# Patient Record
Sex: FEMALE | Race: BLACK OR AFRICAN AMERICAN | Employment: OTHER | URBAN - METROPOLITAN AREA
[De-identification: names, ages, dates, MRNs, and addresses within clinical notes are randomized per-mention and may not be internally consistent; named-entity substitution may affect disease eponyms.]

---

## 2018-02-13 ENCOUNTER — APPOINTMENT (OUTPATIENT)
Dept: CT IMAGING | Age: 53
DRG: 390 | End: 2018-02-13
Attending: PHYSICIAN ASSISTANT
Payer: OTHER GOVERNMENT

## 2018-02-13 ENCOUNTER — HOSPITAL ENCOUNTER (INPATIENT)
Age: 53
LOS: 1 days | Discharge: HOME | DRG: 390 | End: 2018-02-16
Attending: EMERGENCY MEDICINE | Admitting: SURGERY
Payer: OTHER GOVERNMENT

## 2018-02-13 DIAGNOSIS — K56.600 PARTIAL SMALL BOWEL OBSTRUCTION (HCC): Primary | ICD-10-CM

## 2018-02-13 LAB
ALBUMIN SERPL-MCNC: 4 G/DL (ref 3.5–5)
ALBUMIN/GLOB SERPL: 1.1 {RATIO} (ref 1.1–2.2)
ALP SERPL-CCNC: 94 U/L (ref 45–117)
ALT SERPL-CCNC: 12 U/L (ref 12–78)
ANION GAP SERPL CALC-SCNC: 11 MMOL/L (ref 5–15)
APPEARANCE UR: CLEAR
AST SERPL-CCNC: 20 U/L (ref 15–37)
BACTERIA URNS QL MICRO: NEGATIVE /HPF
BASOPHILS # BLD: 0 K/UL (ref 0–0.1)
BASOPHILS NFR BLD: 0 % (ref 0–1)
BILIRUB SERPL-MCNC: 0.9 MG/DL (ref 0.2–1)
BILIRUB UR QL: NEGATIVE
BUN SERPL-MCNC: 12 MG/DL (ref 6–20)
BUN/CREAT SERPL: 13 (ref 12–20)
CALCIUM SERPL-MCNC: 9.7 MG/DL (ref 8.5–10.1)
CHLORIDE SERPL-SCNC: 104 MMOL/L (ref 97–108)
CO2 SERPL-SCNC: 25 MMOL/L (ref 21–32)
COLOR UR: ABNORMAL
CREAT SERPL-MCNC: 0.94 MG/DL (ref 0.55–1.02)
DIFFERENTIAL METHOD BLD: ABNORMAL
EOSINOPHIL # BLD: 0 K/UL (ref 0–0.4)
EOSINOPHIL NFR BLD: 0 % (ref 0–7)
EPITH CASTS URNS QL MICRO: ABNORMAL /LPF
ERYTHROCYTE [DISTWIDTH] IN BLOOD BY AUTOMATED COUNT: 12.2 % (ref 11.5–14.5)
GLOBULIN SER CALC-MCNC: 3.7 G/DL (ref 2–4)
GLUCOSE SERPL-MCNC: 101 MG/DL (ref 65–100)
GLUCOSE UR STRIP.AUTO-MCNC: NEGATIVE MG/DL
HCT VFR BLD AUTO: 38 % (ref 35–47)
HGB BLD-MCNC: 12.9 G/DL (ref 11.5–16)
HGB UR QL STRIP: NEGATIVE
HYALINE CASTS URNS QL MICRO: ABNORMAL /LPF (ref 0–5)
IMM GRANULOCYTES # BLD: 0 K/UL (ref 0–0.04)
IMM GRANULOCYTES NFR BLD AUTO: 0 % (ref 0–0.5)
KETONES UR QL STRIP.AUTO: 15 MG/DL
LEUKOCYTE ESTERASE UR QL STRIP.AUTO: NEGATIVE
LIPASE SERPL-CCNC: 114 U/L (ref 73–393)
LYMPHOCYTES # BLD: 1.6 K/UL (ref 0.8–3.5)
LYMPHOCYTES NFR BLD: 36 % (ref 12–49)
MCH RBC QN AUTO: 31 PG (ref 26–34)
MCHC RBC AUTO-ENTMCNC: 33.9 G/DL (ref 30–36.5)
MCV RBC AUTO: 91.3 FL (ref 80–99)
MONOCYTES # BLD: 0.2 K/UL (ref 0–1)
MONOCYTES NFR BLD: 4 % (ref 5–13)
NEUTS SEG # BLD: 2.7 K/UL (ref 1.8–8)
NEUTS SEG NFR BLD: 59 % (ref 32–75)
NITRITE UR QL STRIP.AUTO: NEGATIVE
NRBC # BLD: 0 K/UL (ref 0–0.01)
NRBC BLD-RTO: 0 PER 100 WBC
PH UR STRIP: 8 [PH] (ref 5–8)
PLATELET # BLD AUTO: 287 K/UL (ref 150–400)
PMV BLD AUTO: 10.2 FL (ref 8.9–12.9)
POTASSIUM SERPL-SCNC: 3.6 MMOL/L (ref 3.5–5.1)
PROT SERPL-MCNC: 7.7 G/DL (ref 6.4–8.2)
PROT UR STRIP-MCNC: NEGATIVE MG/DL
RBC # BLD AUTO: 4.16 M/UL (ref 3.8–5.2)
RBC #/AREA URNS HPF: ABNORMAL /HPF (ref 0–5)
SODIUM SERPL-SCNC: 140 MMOL/L (ref 136–145)
SP GR UR REFRACTOMETRY: 1.02 (ref 1–1.03)
UR CULT HOLD, URHOLD: NORMAL
UROBILINOGEN UR QL STRIP.AUTO: 0.2 EU/DL (ref 0.2–1)
WBC # BLD AUTO: 4.5 K/UL (ref 3.6–11)
WBC URNS QL MICRO: ABNORMAL /HPF (ref 0–4)

## 2018-02-13 PROCEDURE — 74011250636 HC RX REV CODE- 250/636: Performed by: EMERGENCY MEDICINE

## 2018-02-13 PROCEDURE — 74011250636 HC RX REV CODE- 250/636: Performed by: PHYSICIAN ASSISTANT

## 2018-02-13 PROCEDURE — 96374 THER/PROPH/DIAG INJ IV PUSH: CPT

## 2018-02-13 PROCEDURE — 96375 TX/PRO/DX INJ NEW DRUG ADDON: CPT

## 2018-02-13 PROCEDURE — 96376 TX/PRO/DX INJ SAME DRUG ADON: CPT

## 2018-02-13 PROCEDURE — 74011250636 HC RX REV CODE- 250/636: Performed by: SURGERY

## 2018-02-13 PROCEDURE — 74011636320 HC RX REV CODE- 636/320: Performed by: RADIOLOGY

## 2018-02-13 PROCEDURE — 80053 COMPREHEN METABOLIC PANEL: CPT | Performed by: PHYSICIAN ASSISTANT

## 2018-02-13 PROCEDURE — 81001 URINALYSIS AUTO W/SCOPE: CPT | Performed by: PHYSICIAN ASSISTANT

## 2018-02-13 PROCEDURE — 36415 COLL VENOUS BLD VENIPUNCTURE: CPT | Performed by: PHYSICIAN ASSISTANT

## 2018-02-13 PROCEDURE — 74177 CT ABD & PELVIS W/CONTRAST: CPT

## 2018-02-13 PROCEDURE — 99284 EMERGENCY DEPT VISIT MOD MDM: CPT

## 2018-02-13 PROCEDURE — 85025 COMPLETE CBC W/AUTO DIFF WBC: CPT | Performed by: PHYSICIAN ASSISTANT

## 2018-02-13 PROCEDURE — 96361 HYDRATE IV INFUSION ADD-ON: CPT

## 2018-02-13 PROCEDURE — 99218 HC RM OBSERVATION: CPT

## 2018-02-13 PROCEDURE — 83690 ASSAY OF LIPASE: CPT | Performed by: PHYSICIAN ASSISTANT

## 2018-02-13 RX ORDER — NALOXONE HYDROCHLORIDE 0.4 MG/ML
0.4 INJECTION, SOLUTION INTRAMUSCULAR; INTRAVENOUS; SUBCUTANEOUS AS NEEDED
Status: DISCONTINUED | OUTPATIENT
Start: 2018-02-13 | End: 2018-02-16 | Stop reason: HOSPADM

## 2018-02-13 RX ORDER — FERROUS SULFATE, DRIED 160(50) MG
1 TABLET, EXTENDED RELEASE ORAL DAILY
COMMUNITY

## 2018-02-13 RX ORDER — MORPHINE SULFATE 2 MG/ML
4 INJECTION, SOLUTION INTRAMUSCULAR; INTRAVENOUS
Status: COMPLETED | OUTPATIENT
Start: 2018-02-13 | End: 2018-02-13

## 2018-02-13 RX ORDER — ONDANSETRON 2 MG/ML
4 INJECTION INTRAMUSCULAR; INTRAVENOUS
Status: COMPLETED | OUTPATIENT
Start: 2018-02-13 | End: 2018-02-13

## 2018-02-13 RX ORDER — HYDROCORTISONE 5 MG/1
5 TABLET ORAL
COMMUNITY

## 2018-02-13 RX ORDER — LEVOTHYROXINE SODIUM 150 UG/1
150 TABLET ORAL
COMMUNITY

## 2018-02-13 RX ORDER — SODIUM CHLORIDE 0.9 % (FLUSH) 0.9 %
5-10 SYRINGE (ML) INJECTION EVERY 8 HOURS
Status: DISCONTINUED | OUTPATIENT
Start: 2018-02-13 | End: 2018-02-16 | Stop reason: HOSPADM

## 2018-02-13 RX ORDER — DOCUSATE SODIUM 100 MG/1
100 CAPSULE, LIQUID FILLED ORAL
COMMUNITY

## 2018-02-13 RX ORDER — MELATONIN
2000 DAILY
COMMUNITY

## 2018-02-13 RX ORDER — MORPHINE SULFATE 4 MG/ML
4 INJECTION INTRAVENOUS
Status: COMPLETED | OUTPATIENT
Start: 2018-02-13 | End: 2018-02-13

## 2018-02-13 RX ORDER — HYDROCORTISONE 5 MG/1
15 TABLET ORAL
COMMUNITY

## 2018-02-13 RX ORDER — DEXTROSE, SODIUM CHLORIDE, AND POTASSIUM CHLORIDE 5; .45; .15 G/100ML; G/100ML; G/100ML
125 INJECTION INTRAVENOUS CONTINUOUS
Status: DISCONTINUED | OUTPATIENT
Start: 2018-02-13 | End: 2018-02-16

## 2018-02-13 RX ORDER — MORPHINE SULFATE 2 MG/ML
2 INJECTION, SOLUTION INTRAMUSCULAR; INTRAVENOUS
Status: DISCONTINUED | OUTPATIENT
Start: 2018-02-13 | End: 2018-02-16 | Stop reason: HOSPADM

## 2018-02-13 RX ORDER — ALENDRONATE SODIUM 70 MG/1
70 TABLET ORAL
COMMUNITY

## 2018-02-13 RX ORDER — ONDANSETRON 2 MG/ML
4 INJECTION INTRAMUSCULAR; INTRAVENOUS
Status: DISCONTINUED | OUTPATIENT
Start: 2018-02-13 | End: 2018-02-16 | Stop reason: HOSPADM

## 2018-02-13 RX ORDER — CLINDAMYCIN HYDROCHLORIDE 300 MG/1
300 CAPSULE ORAL 4 TIMES DAILY
Qty: 28 CAP | Refills: 0 | Status: SHIPPED | OUTPATIENT
Start: 2018-02-13 | End: 2018-02-13

## 2018-02-13 RX ORDER — SODIUM CHLORIDE 0.9 % (FLUSH) 0.9 %
5-10 SYRINGE (ML) INJECTION AS NEEDED
Status: DISCONTINUED | OUTPATIENT
Start: 2018-02-13 | End: 2018-02-16 | Stop reason: HOSPADM

## 2018-02-13 RX ORDER — HYDROCHLOROTHIAZIDE 12.5 MG/1
12.5 TABLET ORAL DAILY
COMMUNITY

## 2018-02-13 RX ADMIN — ONDANSETRON 4 MG: 2 INJECTION INTRAMUSCULAR; INTRAVENOUS at 18:19

## 2018-02-13 RX ADMIN — IOPAMIDOL 100 ML: 755 INJECTION, SOLUTION INTRAVENOUS at 19:22

## 2018-02-13 RX ADMIN — MORPHINE SULFATE 4 MG: 2 INJECTION, SOLUTION INTRAMUSCULAR; INTRAVENOUS at 21:00

## 2018-02-13 RX ADMIN — MORPHINE SULFATE 4 MG: 4 INJECTION INTRAVENOUS at 18:20

## 2018-02-13 RX ADMIN — ONDANSETRON 4 MG: 2 INJECTION INTRAMUSCULAR; INTRAVENOUS at 22:55

## 2018-02-13 RX ADMIN — DEXTROSE MONOHYDRATE, SODIUM CHLORIDE, AND POTASSIUM CHLORIDE 150 ML/HR: 50; 4.5; 1.49 INJECTION, SOLUTION INTRAVENOUS at 22:58

## 2018-02-13 RX ADMIN — SODIUM CHLORIDE 1000 ML: 900 INJECTION, SOLUTION INTRAVENOUS at 18:19

## 2018-02-13 RX ADMIN — MORPHINE SULFATE 2 MG: 2 INJECTION, SOLUTION INTRAMUSCULAR; INTRAVENOUS at 22:56

## 2018-02-13 RX ADMIN — Medication 10 ML: at 22:00

## 2018-02-13 NOTE — ED PROVIDER NOTES
HPI Comments: Kayla Sierra is a 52 y.o. female  who presents by private vehicle to ER with c/o Patient presents with:  Abdominal Pain. Patient with abdominal pain that started around 3pm today after drinking cranberry juice. PAtient reports nausea. Denies vomiting or diarrhea. Patient reports cramping pain. She specifically denies any fevers, chills, vomiting, chest pain, shortness of breath, headache, rash, diarrhea, , urinary/bowel changes, sweating or weight loss. PCP: Shayne Leos MD   PMHx significant for: No past medical history on file. PSHx significant for: No past surgical history on file. Social Hx: Tobacco use: Smoking status: Not on file                        Smokeless status: Not on file                     ; EtOH use: The patient states she drinks 0 per week.; Illicit Drug use: Allergies:   -- Ultram (Tramadol) -- Other (comments)    --  Impending doom    There are no other complaints, changes or physical findings at this time. Patient is a 52 y.o. female presenting with abdominal pain. The history is provided by the patient. Abdominal Pain    This is a new problem. The current episode started 1 to 2 hours ago. The problem occurs constantly. The problem has not changed since onset. The pain is located in the epigastric region. The quality of the pain is sharp. The pain is at a severity of 10/10. The pain is severe. Associated symptoms include nausea. Pertinent negatives include no anorexia, no belching, no flatus, no vomiting, no constipation, no dysuria, no trauma, no chest pain and no back pain. Nothing worsens the pain. The pain is relieved by nothing. Her past medical history does not include ulcerative colitis, UTI or diverticulitis. The patient's surgical history non-contributory. No past medical history on file. No past surgical history on file. No family history on file.     Social History     Social History    Marital status:      Spouse name: N/A  Number of children: N/A    Years of education: N/A     Occupational History    Not on file. Social History Main Topics    Smoking status: Not on file    Smokeless tobacco: Not on file    Alcohol use Not on file    Drug use: Not on file    Sexual activity: Not on file     Other Topics Concern    Not on file     Social History Narrative         ALLERGIES: Ultram [tramadol]    Review of Systems   Cardiovascular: Negative for chest pain. Gastrointestinal: Positive for abdominal pain and nausea. Negative for anorexia, constipation, flatus and vomiting. Genitourinary: Negative for dysuria. Musculoskeletal: Negative for back pain. Vitals:    02/13/18 1753   BP: 118/58   Pulse: 83   Resp: 18   Temp: 97.7 °F (36.5 °C)   SpO2: 100%   Weight: 90.7 kg (200 lb)   Height: 5' 4\" (1.626 m)            Physical Exam   Constitutional: She is oriented to person, place, and time. She appears well-developed. She appears ill. HENT:   Head: Normocephalic and atraumatic. Right Ear: External ear normal.   Left Ear: External ear normal.   Nose: Nose normal.   Mouth/Throat: Oropharynx is clear and moist. No oropharyngeal exudate. Eyes: Conjunctivae, EOM and lids are normal. Right eye exhibits no discharge. Left eye exhibits no discharge. Neck: Normal range of motion. No tracheal deviation present. No thyromegaly present. Cardiovascular: Normal rate, regular rhythm, normal heart sounds and intact distal pulses. Pulmonary/Chest: Effort normal and breath sounds normal.   Abdominal: Soft. Normal appearance and bowel sounds are normal. There is tenderness in the epigastric area. Musculoskeletal: Normal range of motion. Neurological: She is alert and oriented to person, place, and time. Skin: Skin is warm and dry. Psychiatric: She has a normal mood and affect.  Judgment normal.        MDM  Number of Diagnoses or Management Options  Partial small bowel obstruction:   Diagnosis management comments: 10:49 PM  Patient is being admitted to the hospital.  The results of their tests and reasons for their admission have been discussed with them and/or available family. They convey agreement and understanding for the need to be admitted and for their admission diagnosis. Consultation has been made with the inpatient physician specialist for hospitalization.     LABORATORY TESTS:  Recent Results (from the past 12 hour(s))  -CBC WITH AUTOMATED DIFF  Collection Time: 02/13/18  6:09 PM       Result                                            Value                         Ref Range                       WBC                                               4.5                           3.6 - 11.0 K/uL                 RBC                                               4.16                          3.80 - 5.20 M/uL                HGB                                               12.9                          11.5 - 16.0 g/dL                HCT                                               38.0                          35.0 - 47.0 %                   MCV                                               91.3                          80.0 - 99.0 FL                  MCH                                               31.0                          26.0 - 34.0 PG                  MCHC                                              33.9                          30.0 - 36.5 g/dL                RDW                                               12.2                          11.5 - 14.5 %                   PLATELET                                          287                           150 - 400 K/uL                  MPV                                               10.2                          8.9 - 12.9 FL                   NRBC                                              0.0                           0  WBC                   ABSOLUTE NRBC                                     0.00                          0.00 - 0.01 K/uL NEUTROPHILS                                       59                            32 - 75 %                       LYMPHOCYTES                                       36                            12 - 49 %                       MONOCYTES                                         4 (L)                         5 - 13 %                        EOSINOPHILS                                       0                             0 - 7 %                         BASOPHILS                                         0                             0 - 1 %                         IMMATURE GRANULOCYTES                             0                             0.0 - 0.5 %                     ABS. NEUTROPHILS                                  2.7                           1.8 - 8.0 K/UL                  ABS. LYMPHOCYTES                                  1.6                           0.8 - 3.5 K/UL                  ABS. MONOCYTES                                    0.2                           0.0 - 1.0 K/UL                  ABS. EOSINOPHILS                                  0.0                           0.0 - 0.4 K/UL                  ABS. BASOPHILS                                    0.0                           0.0 - 0.1 K/UL                  ABS. IMM.  GRANS.                                  0.0                           0.00 - 0.04 K/UL                DF                                                AUTOMATED                                                -METABOLIC PANEL, COMPREHENSIVE  Collection Time: 02/13/18  6:09 PM       Result                                            Value                         Ref Range                       Sodium                                            140                           136 - 145 mmol/L                Potassium                                         3.6                           3.5 - 5.1 mmol/L                Chloride                                          104                           97 - 108 mmol/L                 CO2                                               25                            21 - 32 mmol/L                  Anion gap                                         11                            5 - 15 mmol/L                   Glucose                                           101 (H)                       65 - 100 mg/dL                  BUN                                               12                            6 - 20 MG/DL                    Creatinine                                        0.94                          0.55 - 1.02 MG/DL               BUN/Creatinine ratio                              13                            12 - 20                         GFR est AA                                        >60                           >60 ml/min/1.73m2               GFR est non-AA                                    >60                           >60 ml/min/1.73m2               Calcium                                           9.7                           8.5 - 10.1 MG/DL                Bilirubin, total                                  0.9                           0.2 - 1.0 MG/DL                 ALT (SGPT)                                        12                            12 - 78 U/L                     AST (SGOT)                                        20                            15 - 37 U/L                     Alk. phosphatase                                  94                            45 - 117 U/L                    Protein, total                                    7.7                           6.4 - 8.2 g/dL                  Albumin                                           4.0                           3.5 - 5.0 g/dL                  Globulin                                          3.7                           2.0 - 4.0 g/dL                  A-G Ratio                                         1.1                           1.1 - 2.2                  -LIPASE  Collection Time: 02/13/18  6:09 PM       Result                                            Value                         Ref Range                       Lipase                                            114                           73 - 393 U/L               -URINALYSIS W/MICROSCOPIC  Collection Time: 02/13/18  7:42 PM       Result                                            Value                         Ref Range                       Color                                             YELLOW/STRAW                                                  Appearance                                        CLEAR                         CLEAR                           Specific gravity                                  1.017                         1.003 - 1.030                   pH (UA)                                           8.0                           5.0 - 8.0                       Protein                                           NEGATIVE                      NEG mg/dL                       Glucose                                           NEGATIVE                      NEG mg/dL                       Ketone                                            15 (A)                        NEG mg/dL                       Bilirubin                                         NEGATIVE                      NEG                             Blood                                             NEGATIVE                      NEG                             Urobilinogen                                      0.2                           0.2 - 1.0 EU/dL                 Nitrites                                          NEGATIVE                      NEG                             Leukocyte Esterase                                NEGATIVE                      NEG                             WBC                                               0-4                           0 - 4 /hpf                      RBC                                               0-5 0 - 5 /hpf                      Epithelial cells                                  FEW                           FEW /lpf                        Bacteria                                          NEGATIVE                      NEG /hpf                        Hyaline cast                                      0-2                           0 - 5 /lpf                 -URINE CULTURE HOLD SAMPLE  Collection Time: 02/13/18  7:42 PM       Result                                            Value                         Ref Range                       Urine culture hold                                                                                          URINE ON HOLD IN MICROBIOLOGY DEPT FOR 3 DAYS. IF UNPRESERVED URINE IS SUBMITTED, IT CANNOT BE USED FOR ADDITIONAL TESTING AFTER 24 HRS, RECOLLECTION WILL BE REQUIRED. IMAGING RESULTS:  See chart    MEDICATIONS GIVEN:  Medications  sodium chloride (NS) flush 5-10 mL (not administered)  sodium chloride (NS) flush 5-10 mL (not administered)  dextrose 5% - 0.45% NaCl with KCl 20 mEq/L infusion (not administered)  morphine injection 2 mg (not administered)  naloxone (NARCAN) injection 0.4 mg (not administered)  ondansetron (ZOFRAN) injection 4 mg (not administered)  sodium chloride 0.9 % bolus infusion 1,000 mL (1,000 mL IntraVENous New Bag 2/13/18 1819)  ondansetron (ZOFRAN) injection 4 mg (4 mg IntraVENous Given 2/13/18 1819)  morphine 4 mg (4 mg IntraVENous Given 2/13/18 1820)  iopamidol (ISOVUE-370) 76 % injection 100 mL (100 mL IntraVENous Given 2/13/18 1922)  morphine injection 4 mg (4 mg IntraVENous Given 2/13/18 2100)    IMPRESSION:  Partial small bowel obstruction  (primary encounter diagnosis)    PLAN:  1.  Admit to hospital         Amount and/or Complexity of Data Reviewed  Clinical lab tests: ordered and reviewed  Tests in the radiology section of CPT®: ordered and reviewed  Tests in the medicine section of CPT®: ordered and reviewed          ED Course Procedures             CONSULT NOTE:   9:17 PM  Diaz Paz PA-C spoke with Dr. Bhargavi Mccracken,   Specialty: Surgery  Discussed pt's hx, disposition, and available diagnostic and imaging results. Reviewed care plans. Consultant agrees with plans as outlined. Will see for admission.

## 2018-02-13 NOTE — IP AVS SNAPSHOT
[address] [place] [address] 
[phone] Patient: Valentine Bruce MRN: [id number] CGZ:0/97/[id number] A check clarissa indicates which time of day the medication should be taken. My Medications CONTINUE taking these medications Instructions Each Dose to Equal  
 Morning Noon Evening Bedtime  
 calcium-vitamin D 500 mg(1,250mg) -200 unit per tablet Commonly known as:  OYSTER SHELL Your last dose was: Your next dose is: Take 1 Tab by mouth daily. 1 Tab  
    
   
   
   
  
 cholecalciferol 1,000 unit tablet Commonly known as:  VITAMIN D3 Your last dose was: Your next dose is: Take 2,000 Units by mouth daily. 2000 Units  
    
   
   
   
  
 docusate sodium 100 mg capsule Commonly known as:  Alfonse Kras Your last dose was: Your next dose is: Take 100 mg by mouth two (2) times daily as needed for Constipation. 100 mg  
    
   
   
   
  
 FOSAMAX 70 mg tablet Generic drug:  alendronate Your last dose was: Your next dose is: Take 70 mg by mouth every Monday. 70 mg  
    
   
   
   
  
 hydroCHLOROthiazide 12.5 mg tablet Commonly known as:  HYDRODIURIL Your last dose was: Your next dose is: Take 12.5 mg by mouth daily. 12.5 mg  
    
   
   
   
  
 * hydrocortisone 5 mg tablet Commonly known as:  CORTEF Your last dose was: Your next dose is: Take 15 mg by mouth Daily (before breakfast). 15 mg  
    
   
   
   
  
 * hydrocortisone 5 mg tablet Commonly known as:  CORTEF Your last dose was: Your next dose is: Take 5 mg by mouth daily (after lunch). 5 mg SYNTHROID 150 mcg tablet Generic drug:  levothyroxine Your last dose was: Your next dose is: Take 150 mcg by mouth Daily (before breakfast). 150 mcg * Notice: This list has 2 medication(s) that are the same as other medications prescribed for you. Read the directions carefully, and ask your doctor or other care provider to review them with you.

## 2018-02-13 NOTE — IP AVS SNAPSHOT
[address] 
[phone] Patient: Eleonora Holt MRN: [id number] NNV:2/43/1421 About your hospitalization You were admitted on:  February 13, 2018 You last received care in the:  [facility] 4M POST SURG ORT 1 You were discharged on:  February 16, 2018 Why you were hospitalized Your primary diagnosis was:  Not on File Your diagnoses also included:  Partial Small Bowel Obstruction, Sbo (Small Bowel Obstruction) Follow-up Information Follow up With Details Comments Contact Info Rachael Gomez MD  As needed [address] [facility] [address] 
[phone] Phys Cristobal, MD   Patient can only remember the practice name and not the physician Discharge Orders None A check clarissa indicates which time of day the medication should be taken. My Medications CONTINUE taking these medications Instructions Each Dose to Equal  
 Morning Noon Evening Bedtime  
 calcium-vitamin D 500 mg(1,250mg) -200 unit per tablet Commonly known as:  OYSTER SHELL Your last dose was: Your next dose is: Take 1 Tab by mouth daily. 1 Tab  
    
   
   
   
  
 cholecalciferol 1,000 unit tablet Commonly known as:  VITAMIN D3 Your last dose was: Your next dose is: Take 2,000 Units by mouth daily. 2000 Units  
    
   
   
   
  
 docusate sodium 100 mg capsule Commonly known as:  Carolin Buckner Your last dose was: Your next dose is: Take 100 mg by mouth two (2) times daily as needed for Constipation. 100 mg  
    
   
   
   
  
 FOSAMAX 70 mg tablet Generic drug:  alendronate Your last dose was: Your next dose is: Take 70 mg by mouth every Monday. 70 mg  
    
   
   
   
  
 hydroCHLOROthiazide 12.5 mg tablet Commonly known as:  HYDRODIURIL  
   
 Your last dose was: Your next dose is: Take 12.5 mg by mouth daily. 12.5 mg  
    
   
   
   
  
 * hydrocortisone 5 mg tablet Commonly known as:  CORTEF Your last dose was: Your next dose is: Take 15 mg by mouth Daily (before breakfast). 15 mg  
    
   
   
   
  
 * hydrocortisone 5 mg tablet Commonly known as:  CORTEF Your last dose was: Your next dose is: Take 5 mg by mouth daily (after lunch). 5 mg SYNTHROID 150 mcg tablet Generic drug:  levothyroxine Your last dose was: Your next dose is: Take 150 mcg by mouth Daily (before breakfast). 150 mcg * Notice: This list has 2 medication(s) that are the same as other medications prescribed for you. Read the directions carefully, and ask your doctor or other care provider to review them with you. Discharge Instructions Patient Discharge Instructions Amanda Ranjans / [id number] : 1965 Admitted 2018 Discharged: 2018 Take Home Medications · It is important that you take the medication exactly as they are prescribed. · Keep your medication in the bottles provided by the pharmacist and keep a list of the medication names, dosages, and times to be taken in your wallet. · Do not take other medications without consulting your doctor. · Do not drive, drink alcohol, or operate machinery when taking sedating pain medication. · Take colace daily while on pain medication to help prevent constipation. You may take over the counter laxatives such as Dulcolax or Miralax as needed for constipation What to do at [facility] Recommended diet: Regular Diet Recommended activity: As tolerated Follow up with Dr. Master Steinberg as needed. Call Dr. Master Steinberg or go to the ER if you develop worsening pain, fever, vomiting, or any other symptoms that concern you. Introducing [facility]! Darin Slade introduces [employer] patient portal. Now you can access parts of your medical record, email your doctor's office, and request medication refills online. 1. In your internet browser, go to [email or web address] 2. Click on the First Time User? Click Here link in the Sign In box. You will see the New Member Sign Up page. 3. Enter your [employer] Access Code exactly as it appears below. You will not need to use this code after youve completed the sign-up process. If you do not sign up before the expiration date, you must request a new code. · [employer] Access Code: [email or web address] Expires: 5/14/2018  6:45 PM 
 
4. Enter the last four digits of your Social Security Number (xxxx) and Date of Birth (mm/dd/yyyy) as indicated and click Submit. You will be taken to the next sign-up page. 5. Create a [employer] ID. This will be your [employer] login ID and cannot be changed, so think of one that is secure and easy to remember. 6. Create a [employer] password. You can change your password at any time. 7. Enter your Password Reset Question and Answer. This can be used at a later time if you forget your password. 8. Enter your e-mail address. You will receive e-mail notification when new information is available in [address] 9. Click Sign Up. You can now view and download portions of your medical record. 10. Click the Download Summary menu link to download a portable copy of your medical information. If you have questions, please visit the Frequently Asked Questions section of the [employer] website. Remember, [employer] is NOT to be used for urgent needs. For medical emergencies, dial 911. Now available from your iPhone and Android! Providers Seen During Your Hospitalization Provider Specialty Primary office phone Joann Goldsmith. Yolande Wilde, [address] [facility] Emergency Medicine [phone] Solange Mistry MD General Surgery [phone] Your Primary Care Physician (PCP) Primary Care Physician Office Phone Office Fax OTHER, PHYS ** None ** ** None ** You are allergic to the following Allergen Reactions Ultram (Tramadol) Other (comments) Impending doom Recent Documentation Height Weight BMI OB Status Smoking Status 1.626 m 90.7 kg 34.33 kg/m2 Postmenopausal Never Smoker Emergency Contacts Name Discharge Info Relation Home Work Mobile Thao Braros DISCHARGE CAREGIVER [3] Unknown [9]   [phone] Patient Belongings The following personal items are in your possession at time of discharge: 
  Dental Appliances: None  Visual Aid: None      Home Medications: None   Jewelry: None  Clothing: At bedside    Other Valuables: None Please provide this summary of care documentation to your next provider. Signatures-by signing, you are acknowledging that this After Visit Summary has been reviewed with you and you have received a copy. Patient Signature:  ____________________________________________________________ Date:  ____________________________________________________________  
  
Shaquille Lazo Provider Signature:  ____________________________________________________________ Date:  ____________________________________________________________

## 2018-02-14 ENCOUNTER — APPOINTMENT (OUTPATIENT)
Dept: GENERAL RADIOLOGY | Age: 53
DRG: 390 | End: 2018-02-14
Attending: SURGERY
Payer: OTHER GOVERNMENT

## 2018-02-14 LAB
ANION GAP SERPL CALC-SCNC: 4 MMOL/L (ref 5–15)
BASOPHILS # BLD: 0 K/UL (ref 0–0.1)
BASOPHILS NFR BLD: 0 % (ref 0–1)
BUN SERPL-MCNC: 10 MG/DL (ref 6–20)
BUN/CREAT SERPL: 12 (ref 12–20)
CALCIUM SERPL-MCNC: 8.3 MG/DL (ref 8.5–10.1)
CHLORIDE SERPL-SCNC: 109 MMOL/L (ref 97–108)
CO2 SERPL-SCNC: 29 MMOL/L (ref 21–32)
CREAT SERPL-MCNC: 0.83 MG/DL (ref 0.55–1.02)
DIFFERENTIAL METHOD BLD: ABNORMAL
EOSINOPHIL # BLD: 0 K/UL (ref 0–0.4)
EOSINOPHIL NFR BLD: 1 % (ref 0–7)
ERYTHROCYTE [DISTWIDTH] IN BLOOD BY AUTOMATED COUNT: 12.3 % (ref 11.5–14.5)
GLUCOSE SERPL-MCNC: 107 MG/DL (ref 65–100)
HCT VFR BLD AUTO: 33 % (ref 35–47)
HGB BLD-MCNC: 11.1 G/DL (ref 11.5–16)
IMM GRANULOCYTES # BLD: 0 K/UL (ref 0–0.04)
IMM GRANULOCYTES NFR BLD AUTO: 0 % (ref 0–0.5)
LYMPHOCYTES # BLD: 1.8 K/UL (ref 0.8–3.5)
LYMPHOCYTES NFR BLD: 44 % (ref 12–49)
MCH RBC QN AUTO: 31.1 PG (ref 26–34)
MCHC RBC AUTO-ENTMCNC: 33.6 G/DL (ref 30–36.5)
MCV RBC AUTO: 92.4 FL (ref 80–99)
MONOCYTES # BLD: 0.4 K/UL (ref 0–1)
MONOCYTES NFR BLD: 10 % (ref 5–13)
NEUTS SEG # BLD: 1.8 K/UL (ref 1.8–8)
NEUTS SEG NFR BLD: 45 % (ref 32–75)
NRBC # BLD: 0 K/UL (ref 0–0.01)
NRBC BLD-RTO: 0 PER 100 WBC
PLATELET # BLD AUTO: 254 K/UL (ref 150–400)
PMV BLD AUTO: 9.7 FL (ref 8.9–12.9)
POTASSIUM SERPL-SCNC: 3.8 MMOL/L (ref 3.5–5.1)
RBC # BLD AUTO: 3.57 M/UL (ref 3.8–5.2)
SODIUM SERPL-SCNC: 142 MMOL/L (ref 136–145)
WBC # BLD AUTO: 4 K/UL (ref 3.6–11)

## 2018-02-14 PROCEDURE — 80048 BASIC METABOLIC PNL TOTAL CA: CPT | Performed by: SURGERY

## 2018-02-14 PROCEDURE — 85025 COMPLETE CBC W/AUTO DIFF WBC: CPT | Performed by: SURGERY

## 2018-02-14 PROCEDURE — 74011250636 HC RX REV CODE- 250/636: Performed by: PHYSICIAN ASSISTANT

## 2018-02-14 PROCEDURE — 74011250636 HC RX REV CODE- 250/636: Performed by: SURGERY

## 2018-02-14 PROCEDURE — 71045 X-RAY EXAM CHEST 1 VIEW: CPT

## 2018-02-14 PROCEDURE — 74019 RADEX ABDOMEN 2 VIEWS: CPT

## 2018-02-14 PROCEDURE — 36415 COLL VENOUS BLD VENIPUNCTURE: CPT | Performed by: SURGERY

## 2018-02-14 PROCEDURE — 99218 HC RM OBSERVATION: CPT

## 2018-02-14 PROCEDURE — 77030032490 HC SLV COMPR SCD KNE COVD -B

## 2018-02-14 PROCEDURE — 96376 TX/PRO/DX INJ SAME DRUG ADON: CPT

## 2018-02-14 PROCEDURE — 96361 HYDRATE IV INFUSION ADD-ON: CPT

## 2018-02-14 PROCEDURE — 74011250637 HC RX REV CODE- 250/637: Performed by: PHYSICIAN ASSISTANT

## 2018-02-14 RX ORDER — FACIAL-BODY WIPES
10 EACH TOPICAL ONCE
Status: COMPLETED | OUTPATIENT
Start: 2018-02-14 | End: 2018-02-14

## 2018-02-14 RX ORDER — FACIAL-BODY WIPES
10 EACH TOPICAL DAILY PRN
Status: DISCONTINUED | OUTPATIENT
Start: 2018-02-14 | End: 2018-02-16 | Stop reason: HOSPADM

## 2018-02-14 RX ORDER — PROCHLORPERAZINE EDISYLATE 5 MG/ML
10 INJECTION INTRAMUSCULAR; INTRAVENOUS
Status: DISCONTINUED | OUTPATIENT
Start: 2018-02-14 | End: 2018-02-16 | Stop reason: HOSPADM

## 2018-02-14 RX ORDER — ENOXAPARIN SODIUM 100 MG/ML
40 INJECTION SUBCUTANEOUS EVERY 24 HOURS
Status: DISCONTINUED | OUTPATIENT
Start: 2018-02-15 | End: 2018-02-16 | Stop reason: HOSPADM

## 2018-02-14 RX ADMIN — MORPHINE SULFATE 2 MG: 2 INJECTION, SOLUTION INTRAMUSCULAR; INTRAVENOUS at 05:55

## 2018-02-14 RX ADMIN — ONDANSETRON 4 MG: 2 INJECTION INTRAMUSCULAR; INTRAVENOUS at 05:58

## 2018-02-14 RX ADMIN — MORPHINE SULFATE 2 MG: 2 INJECTION, SOLUTION INTRAMUSCULAR; INTRAVENOUS at 09:39

## 2018-02-14 RX ADMIN — DEXTROSE MONOHYDRATE, SODIUM CHLORIDE, AND POTASSIUM CHLORIDE 150 ML/HR: 50; 4.5; 1.49 INJECTION, SOLUTION INTRAVENOUS at 05:55

## 2018-02-14 RX ADMIN — ONDANSETRON 4 MG: 2 INJECTION INTRAMUSCULAR; INTRAVENOUS at 09:40

## 2018-02-14 RX ADMIN — Medication 10 ML: at 06:00

## 2018-02-14 RX ADMIN — PROCHLORPERAZINE EDISYLATE 10 MG: 5 INJECTION INTRAMUSCULAR; INTRAVENOUS at 17:39

## 2018-02-14 RX ADMIN — MORPHINE SULFATE 2 MG: 2 INJECTION, SOLUTION INTRAMUSCULAR; INTRAVENOUS at 17:36

## 2018-02-14 RX ADMIN — BISACODYL 10 MG: 10 SUPPOSITORY RECTAL at 19:59

## 2018-02-14 NOTE — PROGRESS NOTES
Patient complained of nausea regardless of pain medication. Correne Heimlich, PA aware of nausea,  NG tube inserted, 14 [place] without difficulty per order.

## 2018-02-14 NOTE — PROGRESS NOTES
125 cc clear liquid in NG tube collection container. NG remains connected to LIWS per order. Patient states she feels better since NG placed with less abdominal pain.

## 2018-02-14 NOTE — PROGRESS NOTES
[facility]: MED RECONCILIATION    Comments/Recommendations:    Patient was able to provide the name and frequency of all her current medications. [place] clarified name, dose and frequencies of current medications.  Patient's Levothyroxine dose was increased from 137 mcg to 150 mcg daily. Information obtained from: [facility]/ Patient    Allergies: Ultram [tramadol]    Prior to Admission Medications:     Prior to Admission Medications   Prescriptions Last Dose Informant Patient Reported? Taking? alendronate (FOSAMAX) 70 mg tablet 2/12/2018 at am  Yes Yes   Sig: Take 70 mg by mouth every Monday. calcium-vitamin D (OYSTER SHELL) 500 mg(1,250mg) -200 unit per tablet 2/12/2018 at Unknown time  Yes Yes   Sig: Take 1 Tab by mouth daily. cholecalciferol (VITAMIN D3) 1,000 unit tablet 2/12/2018 at Unknown time  Yes Yes   Sig: Take 2,000 Units by mouth daily. docusate sodium (COLACE) 100 mg capsule   Yes Yes   Sig: Take 100 mg by mouth two (2) times daily as needed for Constipation. hydroCHLOROthiazide (HYDRODIURIL) 12.5 mg tablet 2/12/2018 at Unknown time  Yes Yes   Sig: Take 12.5 mg by mouth daily. hydrocortisone (CORTEF) 5 mg tablet 2/12/2018 at am  Yes Yes   Sig: Take 15 mg by mouth Daily (before breakfast). hydrocortisone (CORTEF) 5 mg tablet 2/12/2018 at Unknown time  Yes Yes   Sig: Take 5 mg by mouth daily (after lunch). levothyroxine (SYNTHROID) 150 mcg tablet 2/12/2018 at am  Yes Yes   Sig: Take 150 mcg by mouth Daily (before breakfast).       Facility-Administered Medications: None                Tri Dumont, PHARMD   Contact: [phone]

## 2018-02-14 NOTE — PROGRESS NOTES
Received call from RHEA Schulz received telephone order for dulcolax suppository prn, and prn compazine for nausea

## 2018-02-14 NOTE — ED NOTES
Fall band placed on pt, bed locked and lowered. Side rails raised, family present at bedside. Call bell in reach. [place] and pillow provided.

## 2018-02-14 NOTE — PHYSICIAN ADVISORY
Letter of Status Determination:   Recommend hospitalization status upgraded from   OBSERVATION  to INPATIENT  Status     Pt Name:  Jasmin Roland   MR#   [address] # [id number] /  [id number]   [facility]#  [id number]   Room and Hospital  411/01  @ [address] [facility]   Hospitalization date  2/13/2018  5:42 PM   Current Attending Physician  Danica Garza MD   Principal diagnosis  <principal problem not specified>   Partial small bowel obstruction    Clinicals  52 y.o. y.o  female hospitalized with above diagnosis   The pt is receiving appropriate treatment and supportive care under surgery team's supervision      [place] ([facility] criteria   Does   apply SBO    STATUS DETERMINATION  Based on documented presenting clinical data, comorbid conditions, high risk of adverse events and deterioration, it is our recommendation that the patient's status should be upgraded from OBSERVATION to INPATIENT status. The final decision of the patient's hospitalization status depends on the attending physician's judgment. Additional comments     Payor: CINDA / Plan: Madhu Dixon 74 / Product Type: [id number] /         Saul Russell MD MPH FACP     Physician Advisor    [address]   [facility], [address] [place] [address]  [phone]        [phone]    .

## 2018-02-14 NOTE — ED NOTES
Bedside and Verbal shift change report given to Ac Salas RN (oncoming nurse) by Rachael Lopez RN (offgoing nurse). Report included the following information SBAR, Kardex, ED Summary, [facility] and Recent Results.

## 2018-02-14 NOTE — PROGRESS NOTES
Received patient  as admission to room 411 from E. D.PAtient is alert and talkative. Family at bedside.  Primary Nurse Car Mackenzie RN and Geraldo Yadav RN performed a dual skin assessment on this patient No impairment noted  Lopez score is 19

## 2018-02-14 NOTE — CONSULTS
Surgery Consult    Subjective: Justin Young is a 52 y.o. female who presented to the ED with c/o abdominal pain. She developed upper abdominal cramping accompanied by N/V yesterday. She had a normal BM just prior to onset but has not had further bowel function. CT shows evidence of SBO. Pain is somewhat improved but she has continued to have N/V. She denies fever or chills. Past Medical History:   Diagnosis Date    Endocrine disease     hypothyroid, hyperpituitary    Hypertension     Ill-defined condition     sarcodosis, osteoporosis     Past Surgical History:   Procedure Laterality Date    BREAST SURGERY PROCEDURE UNLISTED      augmentation    HX GYN      tubal ligation    HX ORTHOPAEDIC      L5-S1 fusion, bunionectomy      History reviewed. No pertinent family history.   Social History     Social History    Marital status:      Spouse name: N/A    Number of children: N/A    Years of education: N/A     Social History Main Topics    Smoking status: Never Smoker    Smokeless tobacco: Never Used    Alcohol use No    Drug use: None    Sexual activity: Not Asked     Other Topics Concern    None     Social History Narrative    None      Current Facility-Administered Medications   Medication Dose Route Frequency    sodium chloride (NS) flush 5-10 mL  5-10 mL IntraVENous Q8H    sodium chloride (NS) flush 5-10 mL  5-10 mL IntraVENous PRN    dextrose 5% - 0.45% NaCl with KCl 20 mEq/L infusion  150 mL/hr IntraVENous CONTINUOUS    morphine injection 2 mg  2 mg IntraVENous Q4H PRN    naloxone (NARCAN) injection 0.4 mg  0.4 mg IntraVENous PRN    ondansetron (ZOFRAN) injection 4 mg  4 mg IntraVENous Q4H PRN      Allergies   Allergen Reactions    Ultram [Tramadol] Other (comments)     Impending doom       Review of Systems:REVIEW OF SYSTEMS:     []     Unable to obtain  ROS due to  []    mental status change  []    sedated   []    intubated   []    Total of 12 systems reviewed as follows:    Constitutional: neg for fevers, chills, weight loss, malaise  Eyes: negative for blurry vision  Ears, nose, mouth, throat, and face: negative for sore throat  Respiratory: negative for SOB  Cardiovascular: negative for CP  Gastrointestinal: + for nausea, vomiting, abdominal pain, negative for diarrhea, constipation, melena, hematochezia  Genitourinary: negative for dysuria  Integument/breast: neg for skin rash  Hematologic/lymphatic: neg for bruising  Musculoskeletal: negative for muscle aches  Neurological: no dizziness or h/a    Objective:      Patient Vitals for the past 8 hrs:   BP Temp Pulse Resp SpO2   18 1156 119/60 98 °F (36.7 °C) 63 16 100 %   18 0600 110/75 - - - 99 %   18 0500 101/45 - - - 100 %       Temp (24hrs), Av.9 °F (36.6 °C), Min:97.7 °F (36.5 °C), Max:98.1 °F (36.7 °C)      Physical Exam:  General:  Alert, cooperative, no distress, appears stated age. Eyes:  Conjunctivae/corneas clear. Nose: Nares normal. Septum midline   Mouth/Throat: Lips, mucosa, and tongue normal.    Neck: Supple, symmetrical, trachea midline   Lungs:   Clear to auscultation bilaterally. Heart:  Regular rate and rhythm   Abdomen:   Soft, distended, mild epigastric tenderness, no peritonitis or guarding. + BS   Extremities: Extremities normal, atraumatic, no cyanosis or edema. Skin: Skin color, texture, turgor normal. No rashes or lesions   Neuro: Alert, oriented, speech clear     Labs: Recent Labs      18   0458   WBC  4.0   HGB  11.1*   HCT  33.0*   PLT  254     Recent Labs      18   0458  18   1809   NA  142  140   K  3.8  3.6   CL  109*  104   CO2  29  25   GLU  107*  101*   BUN  10  12   CREA  0.83  0.94   CA  8.3*  9.7   ALB   --   4.0   TBILI   --   0.9   SGOT   --   20   ALT   --   12     No results for input(s): INR in the last 72 hours.     No lab exists for component: INREXT      Assessment and Plan:     SBO    Plan for admission and conservative management with NG, IVF, bowel rest. Abd XR this am shows persistent obstruction. No indication for emergent exploration but may require surgery if condition deteriorates or fails to improve. Thank you for the consultation. Plan discussed with Dr. Edna Ortiz.        Signed By: RHEA Chang     February 14, 2018

## 2018-02-14 NOTE — PROGRESS NOTES
2/14/2018   CARE MANAGEMENT NOTE:  CM is following pt in the ER for initial discharge planning. EMR reviewed. CM met with pt (dtr at bedside) to obtain hx for this needs assessment. Reportedly, pt resides with her dtr and MELISA iin a third floor apt with several flights of stairs. PTA, pt was ambulatory without any assistive device, is indepn with ADLs, and drives. Pt is a  having served 25 years in the [employer]. She obtains medications from the [facility] does not have home healthcare nor DME for home use. PCP is Dr. Andreas Eisenberg in the [facility] at the [place]. Pt is currently admitted under OBS status. CM provided written and oral explanation of State OBS letter. A signed copy will be scanned into pt's chart. Plan is for pt to return home when medically stable - she has no objection to home health if ordered.   Tessa

## 2018-02-15 ENCOUNTER — APPOINTMENT (OUTPATIENT)
Dept: GENERAL RADIOLOGY | Age: 53
DRG: 390 | End: 2018-02-15
Attending: PHYSICIAN ASSISTANT
Payer: OTHER GOVERNMENT

## 2018-02-15 PROBLEM — K56.609 SBO (SMALL BOWEL OBSTRUCTION) (HCC): Status: ACTIVE | Noted: 2018-02-15

## 2018-02-15 LAB
ANION GAP SERPL CALC-SCNC: 8 MMOL/L (ref 5–15)
BASOPHILS # BLD: 0 K/UL (ref 0–0.1)
BASOPHILS NFR BLD: 0 % (ref 0–1)
BUN SERPL-MCNC: 7 MG/DL (ref 6–20)
BUN/CREAT SERPL: 7 (ref 12–20)
CALCIUM SERPL-MCNC: 8.3 MG/DL (ref 8.5–10.1)
CHLORIDE SERPL-SCNC: 105 MMOL/L (ref 97–108)
CO2 SERPL-SCNC: 27 MMOL/L (ref 21–32)
CREAT SERPL-MCNC: 0.96 MG/DL (ref 0.55–1.02)
DIFFERENTIAL METHOD BLD: NORMAL
EOSINOPHIL # BLD: 0.1 K/UL (ref 0–0.4)
EOSINOPHIL NFR BLD: 2 % (ref 0–7)
ERYTHROCYTE [DISTWIDTH] IN BLOOD BY AUTOMATED COUNT: 12.2 % (ref 11.5–14.5)
GLUCOSE SERPL-MCNC: 100 MG/DL (ref 65–100)
HCT VFR BLD AUTO: 38.2 % (ref 35–47)
HGB BLD-MCNC: 12.4 G/DL (ref 11.5–16)
IMM GRANULOCYTES # BLD: 0 K/UL (ref 0–0.04)
IMM GRANULOCYTES NFR BLD AUTO: 0 % (ref 0–0.5)
LYMPHOCYTES # BLD: 1.6 K/UL (ref 0.8–3.5)
LYMPHOCYTES NFR BLD: 35 % (ref 12–49)
MAGNESIUM SERPL-MCNC: 1.8 MG/DL (ref 1.6–2.4)
MCH RBC QN AUTO: 30.6 PG (ref 26–34)
MCHC RBC AUTO-ENTMCNC: 32.5 G/DL (ref 30–36.5)
MCV RBC AUTO: 94.3 FL (ref 80–99)
MONOCYTES # BLD: 0.4 K/UL (ref 0–1)
MONOCYTES NFR BLD: 9 % (ref 5–13)
NEUTS SEG # BLD: 2.4 K/UL (ref 1.8–8)
NEUTS SEG NFR BLD: 54 % (ref 32–75)
NRBC # BLD: 0 K/UL (ref 0–0.01)
NRBC BLD-RTO: 0 PER 100 WBC
PLATELET # BLD AUTO: 271 K/UL (ref 150–400)
PMV BLD AUTO: 10.3 FL (ref 8.9–12.9)
POTASSIUM SERPL-SCNC: 3.7 MMOL/L (ref 3.5–5.1)
RBC # BLD AUTO: 4.05 M/UL (ref 3.8–5.2)
SODIUM SERPL-SCNC: 140 MMOL/L (ref 136–145)
WBC # BLD AUTO: 4.6 K/UL (ref 3.6–11)

## 2018-02-15 PROCEDURE — 65270000029 HC RM PRIVATE

## 2018-02-15 PROCEDURE — 85025 COMPLETE CBC W/AUTO DIFF WBC: CPT | Performed by: PHYSICIAN ASSISTANT

## 2018-02-15 PROCEDURE — 83735 ASSAY OF MAGNESIUM: CPT | Performed by: PHYSICIAN ASSISTANT

## 2018-02-15 PROCEDURE — 74011250636 HC RX REV CODE- 250/636: Performed by: PHYSICIAN ASSISTANT

## 2018-02-15 PROCEDURE — 99218 HC RM OBSERVATION: CPT

## 2018-02-15 PROCEDURE — 74018 RADEX ABDOMEN 1 VIEW: CPT

## 2018-02-15 PROCEDURE — 80048 BASIC METABOLIC PNL TOTAL CA: CPT | Performed by: PHYSICIAN ASSISTANT

## 2018-02-15 PROCEDURE — 36415 COLL VENOUS BLD VENIPUNCTURE: CPT | Performed by: PHYSICIAN ASSISTANT

## 2018-02-15 RX ADMIN — Medication 10 ML: at 06:25

## 2018-02-15 RX ADMIN — DEXTROSE MONOHYDRATE, SODIUM CHLORIDE, AND POTASSIUM CHLORIDE 125 ML/HR: 50; 4.5; 1.49 INJECTION, SOLUTION INTRAVENOUS at 08:39

## 2018-02-15 RX ADMIN — DEXTROSE MONOHYDRATE, SODIUM CHLORIDE, AND POTASSIUM CHLORIDE 125 ML/HR: 50; 4.5; 1.49 INJECTION, SOLUTION INTRAVENOUS at 19:17

## 2018-02-15 RX ADMIN — ENOXAPARIN SODIUM 40 MG: 40 INJECTION SUBCUTANEOUS at 23:01

## 2018-02-15 RX ADMIN — Medication 10 ML: at 23:01

## 2018-02-15 RX ADMIN — ENOXAPARIN SODIUM 40 MG: 40 INJECTION SUBCUTANEOUS at 00:19

## 2018-02-15 RX ADMIN — Medication 10 ML: at 14:00

## 2018-02-15 RX ADMIN — DEXTROSE MONOHYDRATE, SODIUM CHLORIDE, AND POTASSIUM CHLORIDE 125 ML/HR: 50; 4.5; 1.49 INJECTION, SOLUTION INTRAVENOUS at 00:18

## 2018-02-15 NOTE — ROUTINE PROCESS
Bedside and Verbal shift change report given to Casey/ [facility] (oncoming nurse) by Jolanta Dumont RN (offgoing nurse). Report included the following information SBAR, Kardex, Procedure Summary, Intake/Output, MAR and Recent Results.

## 2018-02-15 NOTE — PROGRESS NOTES
NG tube clamped by RHEA Quezada.  Verbal order received to unclamp NG tube at 1200 and call with output

## 2018-02-15 NOTE — PROGRESS NOTES
General Surgery Daily Progress Note    Patient: Mónica Burgos MRN: [id number]  SSN: [id number]    YOB: 1965  Age: 52 y.o. Sex: female      Admit Date: 2/13/2018    Subjective:   + flatus and BM, pain improved. Still has occasional nausea. Current Facility-Administered Medications   Medication Dose Route Frequency    enoxaparin (LOVENOX) injection 40 mg  40 mg SubCUTAneous Q24H    bisacodyl (DULCOLAX) suppository 10 mg  10 mg Rectal DAILY PRN    prochlorperazine (COMPAZINE) injection 10 mg  10 mg IntraVENous Q6H PRN    sodium chloride (NS) flush 5-10 mL  5-10 mL IntraVENous Q8H    sodium chloride (NS) flush 5-10 mL  5-10 mL IntraVENous PRN    dextrose 5% - 0.45% NaCl with KCl 20 mEq/L infusion  125 mL/hr IntraVENous CONTINUOUS    morphine injection 2 mg  2 mg IntraVENous Q4H PRN    naloxone (NARCAN) injection 0.4 mg  0.4 mg IntraVENous PRN    ondansetron (ZOFRAN) injection 4 mg  4 mg IntraVENous Q4H PRN        Objective:      02/13 1901 - 02/15 0700  In: 1110.4 [I.V.:910.4]  Out: 1125 [Urine:950]  Patient Vitals for the past 8 hrs:   BP Temp Pulse Resp SpO2   02/15/18 0840 113/66 - - - -   02/15/18 0833 (!) 88/63 98.9 °F (37.2 °C) 65 16 98 %       Physical Exam:  General: Alert, cooperative, NAD  Lungs: Unlabored  Heart:  Regular rate and  rhythm  Abdomen: Soft, non-tender, non-distended  Extremities: Warm, moves all, no edema  Skin:  Warm and dry, no rash    Labs: Recent Labs      02/15/18   0353   WBC  4.6   HGB  12.4   HCT  38.2   PLT  271     Recent Labs      02/15/18   0353   02/13/18   1809   NA  140   < >  140   K  3.7   < >  3.6   CL  105   < >  104   CO2  27   < >  25   GLU  100   < >  101*   BUN  7   < >  12   CREA  0.96   < >  0.94   CA  8.3*   < >  9.7   MG  1.8   --    --    ALB   --    --   4.0   TBILI   --    --   0.9   SGOT   --    --   20   ALT   --    --   12    < > = values in this interval not displayed.        Assessment / Plan:   · SBO  · Showing signs of resolution  · KUB  · Clamp trial  · Mobilize

## 2018-02-15 NOTE — PROGRESS NOTES
JESSICA Lomas notified of KUB results. New order to reconnect to [facility] for 15 minutes. If less than 200mL, may remove NG tube. 1130am- NG tube reconnected to felipe HODGE at 500mL. 1145am-550mL noted in canister. JESSICA Lomas notified of trial. New order to remove NG tube and start clears. 1155am- NG tube removed, patient tolerated well. Patient encourage to ambulate. Resident tolerated clears without difficulty. No complaints of nausea or vomiting expressed. Will continue to monitor.

## 2018-02-16 VITALS
HEIGHT: 64 IN | HEART RATE: 71 BPM | WEIGHT: 200 LBS | DIASTOLIC BLOOD PRESSURE: 88 MMHG | SYSTOLIC BLOOD PRESSURE: 122 MMHG | OXYGEN SATURATION: 98 % | RESPIRATION RATE: 16 BRPM | BODY MASS INDEX: 34.15 KG/M2 | TEMPERATURE: 97.9 F

## 2018-02-16 LAB
ANION GAP SERPL CALC-SCNC: 3 MMOL/L (ref 5–15)
BASOPHILS # BLD: 0 K/UL (ref 0–0.1)
BASOPHILS NFR BLD: 0 % (ref 0–1)
BUN SERPL-MCNC: 7 MG/DL (ref 6–20)
BUN/CREAT SERPL: 9 (ref 12–20)
CALCIUM SERPL-MCNC: 8 MG/DL (ref 8.5–10.1)
CHLORIDE SERPL-SCNC: 108 MMOL/L (ref 97–108)
CO2 SERPL-SCNC: 29 MMOL/L (ref 21–32)
CREAT SERPL-MCNC: 0.81 MG/DL (ref 0.55–1.02)
DIFFERENTIAL METHOD BLD: ABNORMAL
EOSINOPHIL # BLD: 0.2 K/UL (ref 0–0.4)
EOSINOPHIL NFR BLD: 5 % (ref 0–7)
ERYTHROCYTE [DISTWIDTH] IN BLOOD BY AUTOMATED COUNT: 12 % (ref 11.5–14.5)
GLUCOSE SERPL-MCNC: 98 MG/DL (ref 65–100)
HCT VFR BLD AUTO: 31.5 % (ref 35–47)
HGB BLD-MCNC: 10.5 G/DL (ref 11.5–16)
IMM GRANULOCYTES # BLD: 0 K/UL (ref 0–0.04)
IMM GRANULOCYTES NFR BLD AUTO: 0 % (ref 0–0.5)
LYMPHOCYTES # BLD: 1.5 K/UL (ref 0.8–3.5)
LYMPHOCYTES NFR BLD: 44 % (ref 12–49)
MAGNESIUM SERPL-MCNC: 1.6 MG/DL (ref 1.6–2.4)
MCH RBC QN AUTO: 31.1 PG (ref 26–34)
MCHC RBC AUTO-ENTMCNC: 33.3 G/DL (ref 30–36.5)
MCV RBC AUTO: 93.2 FL (ref 80–99)
MONOCYTES # BLD: 0.3 K/UL (ref 0–1)
MONOCYTES NFR BLD: 8 % (ref 5–13)
NEUTS SEG # BLD: 1.5 K/UL (ref 1.8–8)
NEUTS SEG NFR BLD: 42 % (ref 32–75)
NRBC # BLD: 0 K/UL (ref 0–0.01)
NRBC BLD-RTO: 0 PER 100 WBC
PLATELET # BLD AUTO: 234 K/UL (ref 150–400)
PMV BLD AUTO: 10.2 FL (ref 8.9–12.9)
POTASSIUM SERPL-SCNC: 3.8 MMOL/L (ref 3.5–5.1)
RBC # BLD AUTO: 3.38 M/UL (ref 3.8–5.2)
SODIUM SERPL-SCNC: 140 MMOL/L (ref 136–145)
WBC # BLD AUTO: 3.5 K/UL (ref 3.6–11)

## 2018-02-16 PROCEDURE — 83735 ASSAY OF MAGNESIUM: CPT | Performed by: PHYSICIAN ASSISTANT

## 2018-02-16 PROCEDURE — 36415 COLL VENOUS BLD VENIPUNCTURE: CPT | Performed by: PHYSICIAN ASSISTANT

## 2018-02-16 PROCEDURE — 74011250636 HC RX REV CODE- 250/636: Performed by: PHYSICIAN ASSISTANT

## 2018-02-16 PROCEDURE — 80048 BASIC METABOLIC PNL TOTAL CA: CPT | Performed by: PHYSICIAN ASSISTANT

## 2018-02-16 PROCEDURE — 74011250636 HC RX REV CODE- 250/636: Performed by: SURGERY

## 2018-02-16 PROCEDURE — 85025 COMPLETE CBC W/AUTO DIFF WBC: CPT | Performed by: PHYSICIAN ASSISTANT

## 2018-02-16 RX ADMIN — DEXTROSE MONOHYDRATE, SODIUM CHLORIDE, AND POTASSIUM CHLORIDE 125 ML/HR: 50; 4.5; 1.49 INJECTION, SOLUTION INTRAVENOUS at 06:11

## 2018-02-16 RX ADMIN — Medication 10 ML: at 13:14

## 2018-02-16 RX ADMIN — ONDANSETRON 4 MG: 2 INJECTION INTRAMUSCULAR; INTRAVENOUS at 14:31

## 2018-02-16 NOTE — PROGRESS NOTES
Problem: Falls - Risk of  Goal: *Absence of Falls  Document Armand Fall Risk and appropriate interventions in the flowsheet.    Outcome: Progressing Towards Goal  Fall Risk Interventions:            Medication Interventions: Teach patient to arise slowly, Patient to call before getting OOB    Elimination Interventions: Call light in reach, Bed/chair exit alarm

## 2018-02-16 NOTE — CDMP QUERY
CT abdomen/pelvis shows a very large hepatic hemangioma in the posterior right lobe of the liver, at least 11.2 x 9.0 x 10.8 cm. Diagnostic studied cannot be used for inpatient coding. Based on your medical judgment, can you please indicate in the medical record the clinical significance, if any, of the above CT findings?    => Hepatic Hemangioma clinically significant (please explain)  => Hepatic Hemangioma incidental finding, not clinically significant   => Other explanation of clinical findings  => Unable to determine (no explanation for clinical findings)    The medical record reflects the following clinical findings, treatment, and risk factors. 51 yo female PMH hypothyroidism who presented to ER with abdominal pain and nausea. PE: epigastric tenderness. CT A/P: partial SBO as well as a very large hepatic hemangioma. Surgery consulted, and patient was admitted to facility. Please clarify and document your clinical opinion in the progress notes and discharge summary including the definitive and/or presumptive diagnosis, (suspected or probable), related to the above clinical findings. Please include clinical findings supporting your diagnosis.     Kasandra Aranda [facility], [address] [place] [address]  [email or web address]

## 2018-02-16 NOTE — PROGRESS NOTES
Bedside and Verbal shift change report given to Mateo Machado (oncoming nurse) by Juan Pitts RN (offgoing nurse). Report included the following information SBAR, Kardex, Procedure Summary, Intake/Output, MAR and Recent Results.

## 2018-02-16 NOTE — ROUTINE PROCESS
Bedside and Verbal shift change report given to Vernon Jansen (oncoming nurse) by Lisa Arrington RN (offgoing nurse). Report included the following information SBAR, Kardex, Procedure Summary, Intake/Output, MAR, Accordion and Recent Results.

## 2018-02-16 NOTE — PROGRESS NOTES
General Surgery Daily Progress Note    Patient: Jasmin Roland MRN: [id number]  SSN: [id number]    YOB: 1965  Age: 52 y.o. Sex: female      Admit Date: 2/13/2018    Subjective: Tolerating fulls, passing flatus. Denies abdominal pain, N/V. No BM    Current Facility-Administered Medications   Medication Dose Route Frequency    enoxaparin (LOVENOX) injection 40 mg  40 mg SubCUTAneous Q24H    bisacodyl (DULCOLAX) suppository 10 mg  10 mg Rectal DAILY PRN    prochlorperazine (COMPAZINE) injection 10 mg  10 mg IntraVENous Q6H PRN    sodium chloride (NS) flush 5-10 mL  5-10 mL IntraVENous Q8H    sodium chloride (NS) flush 5-10 mL  5-10 mL IntraVENous PRN    morphine injection 2 mg  2 mg IntraVENous Q4H PRN    naloxone (NARCAN) injection 0.4 mg  0.4 mg IntraVENous PRN    ondansetron (ZOFRAN) injection 4 mg  4 mg IntraVENous Q4H PRN        Objective:      02/14 1901 - 02/16 0700  In: -   Out: 450 [Urine:400]  Patient Vitals for the past 8 hrs:   BP Temp Pulse Resp SpO2   02/16/18 1129 122/88 97.9 °F (36.6 °C) 71 16 98 %   02/16/18 0743 125/70 98 °F (36.7 °C) 60 15 99 %       Physical Exam:  General: Alert, cooperative, NAD  Lungs: Unlabored  Heart:  Regular rate and  rhythm  Abdomen: Soft, non-tender, non-distended  Extremities: Warm, moves all, no edema  Skin:  Warm and dry, no rash    Labs:   Recent Labs      02/16/18   0334   WBC  3.5*   HGB  10.5*   HCT  31.5*   PLT  234     Recent Labs      02/16/18   0334   02/13/18   1809   NA  140   < >  140   K  3.8   < >  3.6   CL  108   < >  104   CO2  29   < >  25   GLU  98   < >  101*   BUN  7   < >  12   CREA  0.81   < >  0.94   CA  8.0*   < >  9.7   MG  1.6   < >   --    ALB   --    --   4.0   TBILI   --    --   0.9   SGOT   --    --   20   ALT   --    --   12    < > = values in this interval not displayed. Assessment / Plan:   · SBO- resolved  · Advance diet   · D/c IVF  · Discharge this afternoon. · Pt prone to constipation.  She declined suppository today. Discussed home bowel regimen.

## 2018-02-17 NOTE — PROGRESS NOTES
Visit documented 2/17/18  Spiritual Care Partner Volunteer visited patient in [place] [address] on 2/16/18.   Documented by:  Author Aliyah [address] ([id number])

## 2018-02-19 NOTE — DISCHARGE SUMMARY
Surgery Discharge Summary     Patient ID:  Tim Rollins  [id number]  52 y.o.  1965    Admit date: 2/13/2018    Discharge date and time: 2/16/2018  5:08 PM     Admission Diagnoses:    Patient Active Problem List   Diagnosis Code    Partial small bowel obstruction K56.600    SBO (small bowel obstruction) K56.609       Discharge Diagnoses: There are no discharge diagnoses documented for the most recent discharge. Patient Active Problem List   Diagnosis Code    Partial small bowel obstruction K56.600    SBO (small bowel obstruction) K56.609       Procedures for this admission: [facility] Course: Ms. Sarah Jenkins presented to the ED on DOA with c/o abdominal pain and was found to have evidence of SBO on CT. She was admitted and managed non-operatively with NG decompression and bowel rest. The following day bowel function had returned and XR showed non-obstructive pattern. NG was discontinued and diet was advanced without issue. She was discharged home in stable condition. Ms. Sarah Jenkins was contacted after discharge regarding incidental CT finding of hepatic hemangioma. She was instructed to follow up with her PCP for further evaluation and she verbalized understanding. Disposition: home    Discharged Condition : stable    Instructions: Follow-up PRN. Follow- up with PCP.              Signed:  RHEA Henriquez  2/19/2018  3:23 PM

## 2018-02-20 NOTE — CDMP QUERY
Patient is noted to have a BMI of 34.3 kg/m2 ( 5'4\", 200 lbs ). Please clarify if this patient is:     =>Obese (BMI 30 - 39.9)  =>Overweight (BMI 25 - 29.9)  =>Other explanation of clinical findings  =>Unable to determine (no explanation for clinical findings)    Please clarify and document your clinical opinion in the progress notes and discharge summary, including the definitive and or presumptive diagnosis, (suspected or probable), related to the above clinical findings. Please include clinical findings supporting your diagnosis. REFERENCE: Per the [facility] Association, \"Individuals who are overweight, obese, or morbidly obese are at an increased risk for certain medical conditions when compared to persons of normal weight. Therefore, these conditions are always clinically significant and reportable. \"

## 2018-02-20 NOTE — PROGRESS NOTES
In response to the CDMP query, pt's hepatic hemangioma is an incidental finding and not clinically significant, as she has no symptoms. With a BMI of 34, she is obese.

## 2024-01-07 NOTE — DISCHARGE INSTRUCTIONS
Patient Discharge Instructions    Justin Young / [id number] : 1965    Admitted 2018 Discharged: 2018     Take Home Medications       · It is important that you take the medication exactly as they are prescribed. · Keep your medication in the bottles provided by the pharmacist and keep a list of the medication names, dosages, and times to be taken in your wallet. · Do not take other medications without consulting your doctor. · Do not drive, drink alcohol, or operate machinery when taking sedating pain medication. · Take colace daily while on pain medication to help prevent constipation. You may take over the counter laxatives such as Dulcolax or Miralax as needed for constipation      What to do at Home    Recommended diet: Regular Diet    Recommended activity: As tolerated     Follow up with Dr. Jun Pittman as needed. The CT performed in the ER also showed a liver hemangioma. This is a benign mass of the liver but should be re-evaluated by your family doctor. Please follow up with your family doctor. Call Dr. Jun Pittman or go to the ER if you develop worsening pain, fever, vomiting, or any other symptoms that concern you. Name band;